# Patient Record
Sex: MALE | Race: WHITE | NOT HISPANIC OR LATINO | URBAN - METROPOLITAN AREA
[De-identification: names, ages, dates, MRNs, and addresses within clinical notes are randomized per-mention and may not be internally consistent; named-entity substitution may affect disease eponyms.]

---

## 2019-07-18 ENCOUNTER — TELEPHONE (OUTPATIENT)
Dept: PRIMARY CARE CLINIC | Facility: CLINIC | Age: 49
End: 2019-07-18

## 2019-07-18 NOTE — TELEPHONE ENCOUNTER
----- Message from Nahed Davenport sent at 7/18/2019 12:15 PM CDT -----  Contact: [phone]  Type:  Same Day Appointment Request    Caller is requesting a same day appointment.      Name of Caller:  Evaristo Davis  When is the first available appointment?  tomorrow  Symptoms:  Vomiting and stomach pain  Best Call Back Number:  [phone]  Additional Information:   Patient missed work and is being told that he cannot return without a doctor's note/would like to be seen today/please call back to schedule or advise.